# Patient Record
(demographics unavailable — no encounter records)

---

## 2025-06-24 NOTE — PHYSICAL EXAM
[de-identified] : The patient is a well developed, well nourished female in no apparent distress. She is alert and oriented X 3 with a pleasant mood and appropriate affect.  On physical examination of the right knee, his ROM is 2-120 degrees. The patient walks with a normal gait and stands in neutral alignment. There is trace effusion. No warmth or erythema is noted. The patella is tender to palpation medially and laterally. There is patellofemoral crepitus noted. The apprehension and grind tests are negative. The extensor mechanism is intact. There is no joint line tenderness. The Socorro sign is absent. The Lachman and pivot shift tests are negative. There is no varus or valgus laxity at 0 or 30 degrees. No posterolateral or anteromedial laxity is noted. No masses are palpable. No other soft tissue or bony tenderness is noted. There is some tenderness noted on palpation of the IT band. Quadriceps weakness is noted. Neurovascular function is intact [de-identified] : Radiographs of both knees show moderate PF DJD right knee

## 2025-06-24 NOTE — END OF VISIT
[FreeTextEntry3] : Dr Grijalva has reviewed the chart and agrees that the record accurately reflects his personal performance of the history, physical exam, assessment, and plan. He personally directed, reviewed, and agreed with the chart.All medical record  entries made by LARISA Goetz, acting as a scribe for this encounter under the direction of Naga Grijalva MD

## 2025-06-24 NOTE — DISCUSSION/SUMMARY
[de-identified] : Ms Rowan has symptomatic DJD in her rigth knee. She has had improvement in the past with HA injections and we will order another injection for her. She  will have a cortisone/HA combo when the injection arrives. All questions were answered. she understands that she will ultimately require knee replacement. All questions were answered. She will call if any issues arise

## 2025-06-24 NOTE — PHYSICAL EXAM
[de-identified] : The patient is a well developed, well nourished female in no apparent distress. She is alert and oriented X 3 with a pleasant mood and appropriate affect.  On physical examination of the right knee, his ROM is 2-120 degrees. The patient walks with a normal gait and stands in neutral alignment. There is trace effusion. No warmth or erythema is noted. The patella is tender to palpation medially and laterally. There is patellofemoral crepitus noted. The apprehension and grind tests are negative. The extensor mechanism is intact. There is no joint line tenderness. The Socorro sign is absent. The Lachman and pivot shift tests are negative. There is no varus or valgus laxity at 0 or 30 degrees. No posterolateral or anteromedial laxity is noted. No masses are palpable. No other soft tissue or bony tenderness is noted. There is some tenderness noted on palpation of the IT band. Quadriceps weakness is noted. Neurovascular function is intact [de-identified] : Radiographs of both knees show moderate PF DJD right knee

## 2025-06-24 NOTE — DISCUSSION/SUMMARY
[de-identified] : Ms Rowan has symptomatic DJD in her rigth knee. She has had improvement in the past with HA injections and we will order another injection for her. She  will have a cortisone/HA combo when the injection arrives. All questions were answered. she understands that she will ultimately require knee replacement. All questions were answered. She will call if any issues arise

## 2025-06-24 NOTE — HISTORY OF PRESENT ILLNESS
[de-identified] : Emily Rowan is a 72 yo woman who presents with ongoing right knee pain for the last four years. She denies any trauma or specific injury. She reports persistent anterior knee pain and swelling. She has limited ROM and pain with ADLS. She has had some improvement in the past with Durolane injections. Her last injection was over a year ago. She has pain with stairs and pain with long distance ambulation. She has intermittent swelling.

## 2025-06-24 NOTE — HISTORY OF PRESENT ILLNESS
[de-identified] : Emily Rowan is a 70 yo woman who presents with ongoing right knee pain for the last four years. She denies any trauma or specific injury. She reports persistent anterior knee pain and swelling. She has limited ROM and pain with ADLS. She has had some improvement in the past with Durolane injections. Her last injection was over a year ago. She has pain with stairs and pain with long distance ambulation. She has intermittent swelling.

## 2025-07-15 NOTE — PROCEDURE
[de-identified] : Emily returns today for a Durolane injection in her right knee. She has tricompartment joint space narrowing on plain film xrays.  She has ongoing tricompartment knee DJD and has managed well in the past with HA injections and cortisone. She had her last injection over a year ago. She had over six months of effective symptomatic improvement with her pain level dropping from a 7/10 to a 1-2/10. . She now reports progressive pain and stiffness over the last 2-3 months. Her pain is back to a 3-5/10 and she has pain with stairs. She denies any locking or buckling  cc: right knee pain and stiffness DX: right knee DJD  Under strict sterile technique, the right knee was prepped with Chloraprep. Using the superolateral approach, with the patient supine, a 3mL injection of Durolane was administered intra-articularly along with 1ml of kenalog. The patient tolerated the procedure well. The patient was instructed to avoid vigorous exercise for 48 hours and will apply ice to the knee for 20 minutes 2-3 times per day if discomfort occurs. Patient will return on an as needed basis. The patient will call if any questions or problems should arise.  Injection was administered by LARISA Goetz     Durolane injection - Right knee joint  +  cortisone  Lot #: [id number] Exp: 11- Man: [employer] NDC: 30919-301-9  Triamcinolone Acetonide 40mg/mL Lot #: [id number] Exp: 2027-02 Man: [employer] NDC: [address]-5.

## 2025-07-15 NOTE — PROCEDURE
[de-identified] : Emily returns today for a Durolane injection in her right knee. She has tricompartment joint space narrowing on plain film xrays.  She has ongoing tricompartment knee DJD and has managed well in the past with HA injections and cortisone. She had her last injection over a year ago. She had over six months of effective symptomatic improvement with her pain level dropping from a 7/10 to a 1-2/10. . She now reports progressive pain and stiffness over the last 2-3 months. Her pain is back to a 3-5/10 and she has pain with stairs. She denies any locking or buckling  cc: right knee pain and stiffness DX: right knee DJD  Under strict sterile technique, the right knee was prepped with Chloraprep. Using the superolateral approach, with the patient supine, a 3mL injection of Durolane was administered intra-articularly along with 1ml of kenalog. The patient tolerated the procedure well. The patient was instructed to avoid vigorous exercise for 48 hours and will apply ice to the knee for 20 minutes 2-3 times per day if discomfort occurs. Patient will return on an as needed basis. The patient will call if any questions or problems should arise.  Injection was administered by LARISA Goetz     Durolane injection - Right knee joint  +  cortisone  Lot #: [id number] Exp: 11- Man: [employer] NDC: 44781-467-5  Triamcinolone Acetonide 40mg/mL Lot #: [id number] Exp: 2027-02 Man: [employer] NDC: [address]-7.